# Patient Record
Sex: FEMALE | Race: WHITE
[De-identification: names, ages, dates, MRNs, and addresses within clinical notes are randomized per-mention and may not be internally consistent; named-entity substitution may affect disease eponyms.]

---

## 2017-04-27 ENCOUNTER — HOSPITAL ENCOUNTER (OUTPATIENT)
Dept: HOSPITAL 39 - GMAL | Age: 62
End: 2017-04-27
Attending: FAMILY MEDICINE
Payer: MEDICARE

## 2017-04-27 DIAGNOSIS — D51.3: Primary | ICD-10-CM

## 2017-04-27 DIAGNOSIS — E55.9: ICD-10-CM

## 2017-04-27 DIAGNOSIS — D53.9: ICD-10-CM

## 2017-04-27 DIAGNOSIS — Z79.899: ICD-10-CM

## 2017-08-02 ENCOUNTER — HOSPITAL ENCOUNTER (OUTPATIENT)
Dept: HOSPITAL 39 - GMAL | Age: 62
Discharge: HOME | End: 2017-08-02
Attending: FAMILY MEDICINE
Payer: MEDICARE

## 2017-08-02 DIAGNOSIS — D50.9: Primary | ICD-10-CM

## 2017-10-13 ENCOUNTER — HOSPITAL ENCOUNTER (OUTPATIENT)
Dept: HOSPITAL 39 - GMAL | Age: 62
Discharge: HOME | End: 2017-10-13
Attending: FAMILY MEDICINE
Payer: MEDICARE

## 2017-10-13 DIAGNOSIS — D50.9: Primary | ICD-10-CM

## 2017-10-23 ENCOUNTER — HOSPITAL ENCOUNTER (OUTPATIENT)
Dept: HOSPITAL 39 - MAMMO | Age: 62
Discharge: HOME | End: 2017-10-23
Attending: FAMILY MEDICINE
Payer: MEDICARE

## 2017-10-23 DIAGNOSIS — Z12.31: Primary | ICD-10-CM

## 2017-10-23 PROCEDURE — 77063 BREAST TOMOSYNTHESIS BI: CPT

## 2018-01-31 ENCOUNTER — HOSPITAL ENCOUNTER (OUTPATIENT)
Dept: HOSPITAL 39 - MAMMO | Age: 63
End: 2018-01-31
Attending: FAMILY MEDICINE
Payer: MEDICARE

## 2018-01-31 DIAGNOSIS — R92.8: Primary | ICD-10-CM

## 2018-01-31 PROCEDURE — 77066 DX MAMMO INCL CAD BI: CPT

## 2018-01-31 PROCEDURE — 76641 ULTRASOUND BREAST COMPLETE: CPT

## 2018-01-31 NOTE — MAM
EXAM DESCRIPTION: 

3D Diagnostic, Bilateral: Digital Mammography



CLINICAL HISTORY: 

62 yearsFemaleABNORMAL MAMMO . No complaints. No family history

of breast cancer. Hysterectomy. Has taken HRT more than 5 years

ago..



COMPARISON: 

3-D screening bilateral study 10/23/2017.   Report from prior

examination also reviewed.



TECHNIQUE: 

Bilateral  LM  projection full-field images, 3-D tomosynthesis

digital mammographic technique. Also bilateral  synthesized    LM

full-field images.  Bilateral 2-D digital spot magnification

images of the retroareolar and middle third of the breast. CAD

utilized for 2-D spot images.



FINDINGS: 

The breast parenchymal density pattern is:  Scattered areas of

fibroglandular density.   No skin thickening or nipple retraction

 densities fibroglandular tissues in the middle third

retroareolar breasts bilaterally, extending along the posterior

nipple line. Medial breast bilaterally and posterior thirds are

almost entirely fatty. Multiple solitary microcalcifications and

groups of microcalcifications associated with this dense or

tissue. Heterogeneous small calcifications in the retroareolar

right breast and at the 1200 clock position 4 cm from the nipple.

Similar position of heterogeneous microcalcifications in the left

breast.



Ultrasound: Scanning bilateral breasts retroareolar and 1200

clock position from the nipple to 4 cm from the nipple. Bilateral

heterogeneous tissue is mostly fibroglandular with minimal fatty

tissue. No discrete solid mass or cyst. No skin changes or large

calcifications. No parenchymal edema. No abnormal Doppler

vascularity.



IMPRESSION: 

BI-RADS CATEGORY: 3 - PROBABLY BENIGN.

Management: Short interval (6-month) follow-up continued

surveillance digital mammography bilaterally.



The FINDINGS and the follow-up plan were reviewed in person with

the patient after the examination. Written communication

explaining the IMPRESSION and follow-up will be mailed to the

patient and referring care provider.



Electronically signed by:  Duglas Vasquez MD  1/31/2018 2:45 PM CST

Workstation: [id number]

## 2018-01-31 NOTE — US
EXAM DESCRIPTION: 

Breast,Bilateral: Ultrasound



CLINICAL HISTORY: 

62 yearsFemaleABNORMAL MAMMO



COMPARISON: 

Digital 3-D/2-D diagnostic mammography  bilateral breast on this

visit. 3-D tomosynthesis bilateral screening examination

10/23/2017.



TECHNIQUE: 

Transcutaneous scanning of the bilateral breasts utilizing

two-dimensional and Doppler modes. Scanning performed by the

sonographer and Dr. Vasquez.



FINDINGS: 

Scanning bilateral breasts retroareolar and 1200 clock position

from the nipple to 4 cm from the nipple. Bilateral heterogeneous

tissue is mostly fibroglandular with minimal fatty tissue. Small

focal areas of echogenicity with acoustic shadowing consistent

with calcifications. No discrete solid mass or cyst. No skin

changes or large calcifications. No parenchymal edema. No

abnormal Doppler vascularity.



IMPRESSION: 

1. Bi-Rads Category 3: Probably Benign Findings.

2.  Please refer to bilateral 3-D tomosynthesis diagnostic

mammographic examination and report on this visit.



The FINDINGS  and the FOLLOW-UP plan were reviewed in person with

the patient after the examination. Written communication

explaining the IMPRESSION and FOLLOW-UP will be mailed to the

patient and referring care provider.



Electronically signed by:  Duglas Vasquez MD  1/31/2018 2:52 PM [facility]

Workstation: [id number]

## 2018-04-20 ENCOUNTER — HOSPITAL ENCOUNTER (OUTPATIENT)
Dept: HOSPITAL 39 - GMAL | Age: 63
End: 2018-04-20
Attending: FAMILY MEDICINE
Payer: MEDICARE

## 2018-04-20 DIAGNOSIS — D51.3: Primary | ICD-10-CM

## 2018-04-20 DIAGNOSIS — E55.9: ICD-10-CM

## 2018-04-20 DIAGNOSIS — D50.9: ICD-10-CM

## 2019-07-11 ENCOUNTER — HOSPITAL ENCOUNTER (OUTPATIENT)
Dept: HOSPITAL 39 - GMAL | Age: 64
End: 2019-07-11
Attending: FAMILY MEDICINE
Payer: MEDICARE

## 2019-07-11 DIAGNOSIS — E55.9: ICD-10-CM

## 2019-07-11 DIAGNOSIS — R53.83: ICD-10-CM

## 2019-07-11 DIAGNOSIS — E53.8: Primary | ICD-10-CM

## 2019-07-11 DIAGNOSIS — I10: ICD-10-CM

## 2019-07-11 DIAGNOSIS — Z79.899: ICD-10-CM

## 2020-05-14 ENCOUNTER — HOSPITAL ENCOUNTER (OUTPATIENT)
Dept: HOSPITAL 39 - GMAL | Age: 65
End: 2020-05-14
Attending: FAMILY MEDICINE
Payer: MEDICARE

## 2020-05-14 DIAGNOSIS — R53.83: Primary | ICD-10-CM

## 2020-05-14 DIAGNOSIS — I10: ICD-10-CM

## 2020-05-14 DIAGNOSIS — E55.9: ICD-10-CM

## 2020-09-29 ENCOUNTER — HOSPITAL ENCOUNTER (OUTPATIENT)
Dept: HOSPITAL 39 - CT | Age: 65
End: 2020-09-29
Attending: FAMILY MEDICINE
Payer: MEDICARE

## 2020-09-29 DIAGNOSIS — K44.9: ICD-10-CM

## 2020-09-29 DIAGNOSIS — M89.9: ICD-10-CM

## 2020-09-29 DIAGNOSIS — M95.4: ICD-10-CM

## 2020-09-29 DIAGNOSIS — M41.9: ICD-10-CM

## 2020-09-29 DIAGNOSIS — R91.8: Primary | ICD-10-CM

## 2020-09-29 NOTE — CT
EXAM DESCRIPTION: 

Chest w/o Contrast



CLINICAL HISTORY: 

OTHER NONSPECIFIC ABNORMAL FINDING OF LUNG FLUID



COMPARISON: 

Rib series September 4, 2020



TECHNIQUE: 

Noncontrast transaxial CT images of the chest are obtained. This

exam was performed according to our departmental

dose-optimization program, which includes automated exposure

control, adjustment of the mA and/or kV according to patient size

and/or use of iterative reconstruction technique .



FINDINGS: 

The heart and great vessels are unremarkable.

No pathologically enlarged mediastinal, hilar, or axillary

lymphadenopathy seen.



No pleural or pericardial effusion. Nodular breast tissue is

seen. The patient should be up to date on routine screening

mammography.  CT is not sensitive or specific for breast

findings.



Visualized portion of the upper abdomen shows surgical clips from

cholecystectomy. Small hiatal hernia.



Lungs are normally aerated. No acute appearing of tracer

consolidation is seen. No bronchiectasis or bronchial wall

thickening. No worrisome pulmonary nodules.



Osseous structures are diffusely osteopenic. Scoliosis of the

thoracic spine with curvature of the upper thoracic spine towards

the left and mid to upper thoracic spine towards the right.

Subtle buckling of the anterior left fifth and sixth ribs. No

obvious cortical disruption or periosteal reaction. Right ribs

are unremarkable. Spondylitic changes of the spine are seen.



IMPRESSION: 

Mild buckling of the anterior left fifth and sixth ribs could

represent age indeterminate nondisplaced rib fractures.



Scoliosis of the thoracic spine is seen.



Hiatal hernia.



Otherwise no acute findings on CT of the chest.





Electronically signed by:  Jp Pozo MD  9/29/2020 2:56 PM CDT

Workstation: [id number]

## 2020-10-02 ENCOUNTER — HOSPITAL ENCOUNTER (OUTPATIENT)
Dept: HOSPITAL 39 - RAD | Age: 65
End: 2020-10-02
Attending: ORTHOPAEDIC SURGERY
Payer: MEDICARE

## 2020-10-02 DIAGNOSIS — M89.9: ICD-10-CM

## 2020-10-02 DIAGNOSIS — S72.144D: Primary | ICD-10-CM

## 2020-10-02 DIAGNOSIS — Z98.890: ICD-10-CM

## 2020-10-02 NOTE — RAD
EXAM DESCRIPTION: 



Hip,Right 2 Views



CLINICAL HISTORY: 



HIP PAIN RIGHT



COMPARISON: 



September 2, 2020



TECHNIQUE: 

Four views right hip and femur



FINDINGS:

 

Four views to include the right hip and femur demonstrate a long

intramedullary dean through the femoral shaft anchored distally at

the diametaphyseal junction. There are likely threaded screw into

the central femoral head through the upper extent is present with

a separate lesser trochanteric fragment. Review of previous

postoperative views showed several longitudinal fracture

extending down the proximal femoral shaft. This is not as well

appreciated on today's examination and there is a questionable

lucency or partial fracture of the lateral cortex of the junction

of the proximal and mid third of the femur. No malalignment or

extension through the cortical margin noted. Alignment of the

intertrochanteric fracture remains essentially anatomic.





IMPRESSION:

 

Stable alignment of internally fixed intertrochanteric fracture

of the right hip long intramedullary dean and interlocking screw.



Questionable subtle nondisplaced fracture of the proximal femoral

shaft which is less apparent proximally compared to previous

postoperative studies .



Electronically signed by:  Casey Judge MD  10/2/2020 2:51 PM

CDT Workstation: [id number]

## 2020-11-05 ENCOUNTER — HOSPITAL ENCOUNTER (OUTPATIENT)
Dept: HOSPITAL 39 - RAD | Age: 65
End: 2020-11-05
Attending: ORTHOPAEDIC SURGERY
Payer: MEDICARE

## 2020-11-05 DIAGNOSIS — Z98.890: ICD-10-CM

## 2020-11-05 DIAGNOSIS — S72.141D: Primary | ICD-10-CM

## 2020-11-06 NOTE — RAD
EXAM DESCRIPTION:

Femur, right



CLINICAL HISTORY:

65 years Female, INTERTOCHANTERIC FRACTURE RIGHT



COMPARISON:

October 2, 2020



FINDINGS:

Four views right femur demonstrate fixation of the

intertrochanteric fracture with threaded pin interlocking with a

long intramedullary dean. Maturing callus formation in the region

of the lesser trochanter noted. No significant change in

alignment noted. The intramedullary dean is anchored distally with

a transverse screw through the distal femoral shaft. Threaded pin

is located centrally within the femoral head.



IMPRESSION:

Healing right intertrochanteric fracture internally fixed with

interlocking threaded pin and intramedullary dean. Developing

callus formation particularly at the separate lesser trochanteric

fragment noted.



Electronically signed by:  Casey Judge MD  11/6/2020 9:24 AM

[facility] Workstation: [id number]

## 2020-12-04 ENCOUNTER — HOSPITAL ENCOUNTER (OUTPATIENT)
Dept: HOSPITAL 39 - RAD | Age: 65
End: 2020-12-04
Attending: ORTHOPAEDIC SURGERY
Payer: MEDICARE

## 2020-12-04 DIAGNOSIS — Z98.890: ICD-10-CM

## 2020-12-04 DIAGNOSIS — S72.141D: Primary | ICD-10-CM

## 2020-12-06 NOTE — RAD
EXAM: Femur,Right



INDICATION: 65 years Female, CLOSED INTERTROCHANTERIC FX OF RIGHT

FEMUR



COMPARISON: 2 views of the right femur 11/5/2020



FINDINGS:



2 views of the right femur were obtained on 4 images. Stable

intramedullary dean with proximal gamma nail and distal transverse

screw. Hardware is intact and appears unchanged from the prior

study of 11/5/2020. Changes of healing with callus formation at

an intertrochanteric fracture have slightly progressed since the

prior examination of 11/5/2020. No new fracture is identified. No

destructive osseous lesion.



IMPRESSION:



1.  Healing and callus formation and a right femur

intertrochanteric fracture, slightly progressed from the prior

study of 11/5/2020.

2.  Stable fixation hardware in the right femur.



Electronically signed by:  Mari Thompson MD  12/6/2020 9:41 AM

[facility] Workstation: [id number]

## 2021-01-04 ENCOUNTER — HOSPITAL ENCOUNTER (OUTPATIENT)
Dept: HOSPITAL 39 - RAD | Age: 66
End: 2021-01-04
Attending: ORTHOPAEDIC SURGERY
Payer: MEDICARE

## 2021-01-04 DIAGNOSIS — S72.141D: Primary | ICD-10-CM

## 2021-01-04 DIAGNOSIS — Z98.890: ICD-10-CM

## 2021-01-04 NOTE — RAD
EXAM DESCRIPTION: 



Femur,Right



CLINICAL HISTORY: 



FRACTURE



COMPARISON: 



2 September 2020



TECHNIQUE: 



3 views right



FINDINGS: 



A long stem gamma nail is seen in the right hip bridging an

comminuted intratrochanteric right hip fracture. Callus formation

is observed at the fracture site. No hardware failure is

detected. No new or acute injury is seen. Screw fixation of the

inferior aspect of the dean is observed. Hardware remains

unchanged the previous exam.



IMPRESSION: 



A long stem gamma nail is seen bridging a comminuted

intratrochanteric fracture of the proximal right femur. Callus

formation is observed at the fracture site.



Electronically signed by:  Sandoval Merchant MD  1/4/2021 12:07 PM

[facility] Workstation: [id number]

## 2021-02-04 ENCOUNTER — HOSPITAL ENCOUNTER (OUTPATIENT)
Dept: HOSPITAL 39 - RAD | Age: 66
End: 2021-02-04
Attending: ORTHOPAEDIC SURGERY
Payer: MEDICARE

## 2021-02-04 DIAGNOSIS — S72.141D: Primary | ICD-10-CM

## 2021-02-04 DIAGNOSIS — Z98.890: ICD-10-CM

## 2021-02-04 NOTE — RAD
EXAM DESCRIPTION: Femur,Right



CLINICAL HISTORY: 65 years Female, FX femur



COMPARISON: Previous x-ray right hip January 4, 2021



FINDINGS: Orthopedic hardware is seen traversing

intertrochanteric fracture of the proximal right femur. No change

of alignment compared to previous study. A post lesser

trochanter. Degenerative narrowing of the SI joints. Sacrum

appears intact. Distal femur is included in the intramedullary

dean is centrally situated with the distal screw in place.



IMPRESSION:



Unchanged alignment of orthopedic hardware stabilizing fractured

proximal right femur.



Electronically signed by:  Román Arciniega MD  2/4/2021 3:58 PM

[facility] Workstation: [id number]